# Patient Record
Sex: FEMALE | Race: WHITE | NOT HISPANIC OR LATINO | Employment: PART TIME | URBAN - METROPOLITAN AREA
[De-identification: names, ages, dates, MRNs, and addresses within clinical notes are randomized per-mention and may not be internally consistent; named-entity substitution may affect disease eponyms.]

---

## 2024-04-18 ENCOUNTER — OFFICE VISIT (OUTPATIENT)
Dept: DERMATOLOGY | Facility: CLINIC | Age: 39
End: 2024-04-18
Payer: MEDICARE

## 2024-04-18 DIAGNOSIS — L30.9 DERMATITIS: ICD-10-CM

## 2024-04-18 DIAGNOSIS — L73.2 HIDRADENITIS SUPPURATIVA: Primary | ICD-10-CM

## 2024-04-18 PROCEDURE — 99204 OFFICE O/P NEW MOD 45 MIN: CPT | Mod: S$PBB,,, | Performed by: STUDENT IN AN ORGANIZED HEALTH CARE EDUCATION/TRAINING PROGRAM

## 2024-04-18 PROCEDURE — 99213 OFFICE O/P EST LOW 20 MIN: CPT | Mod: PBBFAC,PN | Performed by: STUDENT IN AN ORGANIZED HEALTH CARE EDUCATION/TRAINING PROGRAM

## 2024-04-18 PROCEDURE — 99999 PR PBB SHADOW E&M-EST. PATIENT-LVL III: CPT | Mod: PBBFAC,,, | Performed by: STUDENT IN AN ORGANIZED HEALTH CARE EDUCATION/TRAINING PROGRAM

## 2024-04-18 PROCEDURE — G2211 COMPLEX E/M VISIT ADD ON: HCPCS | Mod: S$PBB,,, | Performed by: STUDENT IN AN ORGANIZED HEALTH CARE EDUCATION/TRAINING PROGRAM

## 2024-04-18 RX ORDER — CLINDAMYCIN PHOSPHATE 10 MG/ML
SOLUTION TOPICAL DAILY
Qty: 60 EACH | Refills: 2 | Status: SHIPPED | OUTPATIENT
Start: 2024-04-18 | End: 2025-04-18

## 2024-04-18 RX ORDER — DOXYCYCLINE HYCLATE 100 MG
100 TABLET ORAL DAILY
Qty: 90 TABLET | Refills: 0 | Status: SHIPPED | OUTPATIENT
Start: 2024-04-18

## 2024-04-18 RX ORDER — MOMETASONE FUROATE 1 MG/G
OINTMENT TOPICAL DAILY
Qty: 45 G | Refills: 1 | Status: SHIPPED | OUTPATIENT
Start: 2024-04-18

## 2024-04-18 NOTE — PROGRESS NOTES
Subjective:      Patient ID:  Darlene Wayne is a 38 y.o. female who presents for No chief complaint on file.    History of Present Illness: The patient presents with chief complaint of bumps/ingrown hairs  Located: on the groin/mons pubis and outer labia .  Duration: recently developed these bumps a few weeks ago  Symptoms: reports largely asymptomatic, but firm nodules on either side of the outer labia  Treatment: was given bactrim by her Ob/gyn with minimal improvement.      pt states she may have poison ivy on the hands  Duration: 3-4 weeks   Signs/Symptoms: swelling, bumps, spreading  Prior treatments: none          Review of Systems   Constitutional:  Negative for fever and chills.   Skin:  Positive for itching, rash and dry skin.       Objective:   Physical Exam   Constitutional: She appears well-developed and well-nourished. No distress.   Genitourinary:         Neurological: She is alert and oriented to person, place, and time. She is not disoriented.   Psychiatric: She has a normal mood and affect.   Skin:   Areas Examined (abnormalities noted in diagram):   Head / Face Inspection Performed  Neck Inspection Performed  Genitals / Buttocks / Groin Inspection Performed  Nails and Digits Inspection Performed           Diagram Legend     Erythematous scaling macule/papule c/w actinic keratosis       Vascular papule c/w angioma      Pigmented verrucoid papule/plaque c/w seborrheic keratosis      Yellow umbilicated papule c/w sebaceous hyperplasia      Irregularly shaped tan macule c/w lentigo     1-2 mm smooth white papules consistent with Milia      Movable subcutaneous cyst with punctum c/w epidermal inclusion cyst      Subcutaneous movable cyst c/w pilar cyst      Firm pink to brown papule c/w dermatofibroma      Pedunculated fleshy papule(s) c/w skin tag(s)      Evenly pigmented macule c/w junctional nevus     Mildly variegated pigmented, slightly irregular-bordered macule c/w mildly atypical nevus       Flesh colored to evenly pigmented papule c/w intradermal nevus       Pink pearly papule/plaque c/w basal cell carcinoma      Erythematous hyperkeratotic cursted plaque c/w SCC      Surgical scar with no sign of skin cancer recurrence      Open and closed comedones      Inflammatory papules and pustules      Verrucoid papule consistent consistent with wart     Erythematous eczematous patches and plaques     Dystrophic onycholytic nail with subungual debris c/w onychomycosis     Umbilicated papule    Erythematous-base heme-crusted tan verrucoid plaque consistent with inflamed seborrheic keratosis     Erythematous Silvery Scaling Plaque c/w Psoriasis     See annotation      Assessment / Plan:        Hidradenitis suppurativa - [place] I  -     doxycycline (VIBRA-TABS) 100 MG tablet; Take 1 tablet (100 mg total) by mouth once daily.  Dispense: 90 tablet; Refill: 0  -     clindamycin (CLEOCIN T) 1 % Swab; Apply topically once daily.  Dispense: 60 each; Refill: 2    Dermatitis - appears consistent with allergic contact dermatitis.   -     mometasone (ELOCON) 0.1 % ointment; Apply topically once daily. For rash.  Dispense: 45 g; Refill: 1             Follow up in about 3 months (around 7/18/2024).

## 2024-05-07 ENCOUNTER — TELEPHONE (OUTPATIENT)
Dept: DERMATOLOGY | Facility: CLINIC | Age: 39
End: 2024-05-07
Payer: MEDICARE

## 2024-05-07 NOTE — TELEPHONE ENCOUNTER
Spoke with patient trying to be seen sooner than scheduked appt. Pt placed on waiting list ----- Message from Saroj Reynolds sent at 5/7/2024 12:07 PM CDT -----  Contact: Patient  Patient is calling to inform that her rash is spreading and getting worst. Please call patient at [phone]   Patient is requesting an appt as there is nothing available to schedule.

## 2024-06-13 ENCOUNTER — OFFICE VISIT (OUTPATIENT)
Dept: DERMATOLOGY | Facility: CLINIC | Age: 39
End: 2024-06-13
Payer: MEDICARE

## 2024-06-13 DIAGNOSIS — L73.2 HIDRADENITIS SUPPURATIVA: Primary | ICD-10-CM

## 2024-06-13 DIAGNOSIS — L30.9 DERMATITIS: ICD-10-CM

## 2024-06-13 PROCEDURE — 99213 OFFICE O/P EST LOW 20 MIN: CPT | Mod: PBBFAC,PN | Performed by: STUDENT IN AN ORGANIZED HEALTH CARE EDUCATION/TRAINING PROGRAM

## 2024-06-13 PROCEDURE — G2211 COMPLEX E/M VISIT ADD ON: HCPCS | Mod: S$PBB,,, | Performed by: STUDENT IN AN ORGANIZED HEALTH CARE EDUCATION/TRAINING PROGRAM

## 2024-06-13 PROCEDURE — 99213 OFFICE O/P EST LOW 20 MIN: CPT | Mod: S$PBB,,, | Performed by: STUDENT IN AN ORGANIZED HEALTH CARE EDUCATION/TRAINING PROGRAM

## 2024-06-13 PROCEDURE — 99999 PR PBB SHADOW E&M-EST. PATIENT-LVL III: CPT | Mod: PBBFAC,,, | Performed by: STUDENT IN AN ORGANIZED HEALTH CARE EDUCATION/TRAINING PROGRAM

## 2024-06-13 NOTE — PROGRESS NOTES
Subjective:       Patient ID:  Darlene Wayne is a 38 y.o. female who presents for   Chief Complaint   Patient presents with    Lesion     History of Present Illness: The patient presents for follow up of HS and eczema on the hands. Last seen on 4/18/24. For HS located in the groin, was started on doxycycline and topical clindamycin. Reports much improvement in symptoms with resolution of the lesions; no recent flares. For the rash on the hands, was started on mometasone ointment and reports resolution as well. Has had some similar breakouts on the upper thighs that has been a little itchy, but hasn't yet tried anything for it. Otherwise, doing well.       Lesion        Review of Systems   Constitutional:  Negative for fever and chills.   Skin:  Positive for itching, rash and dry skin.        Objective:    Physical Exam   Constitutional: She appears well-developed and well-nourished. No distress.   Neurological: She is alert and oriented to person, place, and time. She is not disoriented.   Psychiatric: She has a normal mood and affect.   Skin:   Areas Examined (abnormalities noted in diagram):   Head / Face Inspection Performed  Neck Inspection Performed  RUE Inspected  LUE Inspection Performed  RLE Inspected  LLE Inspection Performed              Diagram Legend     Erythematous scaling macule/papule c/w actinic keratosis       Vascular papule c/w angioma      Pigmented verrucoid papule/plaque c/w seborrheic keratosis      Yellow umbilicated papule c/w sebaceous hyperplasia      Irregularly shaped tan macule c/w lentigo     1-2 mm smooth white papules consistent with Milia      Movable subcutaneous cyst with punctum c/w epidermal inclusion cyst      Subcutaneous movable cyst c/w pilar cyst      Firm pink to brown papule c/w dermatofibroma      Pedunculated fleshy papule(s) c/w skin tag(s)      Evenly pigmented macule c/w junctional nevus     Mildly variegated pigmented, slightly irregular-bordered macule c/w mildly  atypical nevus      Flesh colored to evenly pigmented papule c/w intradermal nevus       Pink pearly papule/plaque c/w basal cell carcinoma      Erythematous hyperkeratotic cursted plaque c/w SCC      Surgical scar with no sign of skin cancer recurrence      Open and closed comedones      Inflammatory papules and pustules      Verrucoid papule consistent consistent with wart     Erythematous eczematous patches and plaques     Dystrophic onycholytic nail with subungual debris c/w onychomycosis     Umbilicated papule    Erythematous-base heme-crusted tan verrucoid plaque consistent with inflamed seborrheic keratosis     Erythematous Silvery Scaling Plaque c/w Psoriasis     See annotation      Assessment / Plan:        Hidradenitis suppurativa - much improvement following doxycyline and topical clindamycin use. Continue as needed for any new flares.     Dermatitis - areas on the hands resolved with mometasone. Has a few rashes present on the thighs; recommend to try mometasone ointment to the area. Counseled patient on gentle skin care regimen, including need for sensitive soaps/detergents, as well as need for frequent use of sensitize moisturizers.            Follow up in about 1 year (around 6/13/2025).

## 2025-01-02 DIAGNOSIS — L73.2 HIDRADENITIS SUPPURATIVA: ICD-10-CM

## 2025-01-02 NOTE — TELEPHONE ENCOUNTER
----- Message from Luciana sent at 1/2/2025 11:06 AM CST -----  Contact: self  Type:  RX Refill Request --2nd request--    Who Called:  Pt   Refill or New Rx: Refill   RX Name and Strength: doxycycline (VIBRA-TABS) 100 MG tablet   How is the patient currently taking it? (ex. 1XDay):  Is this a 30 day or 90 day RX:   Preferred Pharmacy with phone number:   [id number] [place] [address] [facility] & [address] [address]  Phone: [phone] Fax: [phone]  Local or Mail Order: Local  Ordering Provider: Dr. Londono   Would the patient rather a call back or a response via [facility]?  Call   Best Call Back Number: [phone]  Additional Information: Pt states she has another flare up / bump in the hair follicle   Please call to advise... Thank you...

## 2025-01-02 NOTE — TELEPHONE ENCOUNTER
----- Message from Yane sent at 12/31/2024 12:11 PM CST -----  Contact: SELF  [phone]  Type:  RX Refill Request    Who Called: Darlene  Refill or New Rx:REFILL  RX Name and Strength: doxycycline (VIBRA-TABS) 100 MG tablet  How is the patient currently taking it? (ex. 1XDay):  Is this a 30 day or 90 day RX:  Preferred Pharmacy with phone number:  [id number] [address] & [address] [address]  Phone: [phone] Fax: [phone]     Local or Mail Order:LOCAL  Ordering Provider:ZAHIDA  Would the patient rather a call back or a response via [facility]? CECELIA  Best Call Back Number: [phone]  Additional Information:

## 2025-02-06 RX ORDER — DOXYCYCLINE HYCLATE 100 MG
100 TABLET ORAL DAILY
Qty: 30 TABLET | Refills: 0 | Status: SHIPPED | OUTPATIENT
Start: 2025-02-06

## 2025-04-01 ENCOUNTER — OFFICE VISIT (OUTPATIENT)
Dept: DERMATOLOGY | Facility: CLINIC | Age: 40
End: 2025-04-01
Payer: MEDICARE

## 2025-04-01 DIAGNOSIS — L73.2 HIDRADENITIS SUPPURATIVA: Primary | ICD-10-CM

## 2025-04-01 PROCEDURE — 99213 OFFICE O/P EST LOW 20 MIN: CPT | Mod: PBBFAC | Performed by: STUDENT IN AN ORGANIZED HEALTH CARE EDUCATION/TRAINING PROGRAM

## 2025-04-01 PROCEDURE — G2211 COMPLEX E/M VISIT ADD ON: HCPCS | Mod: S$PBB,,, | Performed by: STUDENT IN AN ORGANIZED HEALTH CARE EDUCATION/TRAINING PROGRAM

## 2025-04-01 PROCEDURE — 99213 OFFICE O/P EST LOW 20 MIN: CPT | Mod: S$PBB,,, | Performed by: STUDENT IN AN ORGANIZED HEALTH CARE EDUCATION/TRAINING PROGRAM

## 2025-04-01 PROCEDURE — 99999 PR PBB SHADOW E&M-EST. PATIENT-LVL III: CPT | Mod: PBBFAC,,, | Performed by: STUDENT IN AN ORGANIZED HEALTH CARE EDUCATION/TRAINING PROGRAM

## 2025-04-01 RX ORDER — DICYCLOMINE HYDROCHLORIDE 10 MG/1
10 CAPSULE ORAL 2 TIMES DAILY PRN
COMMUNITY
Start: 2025-01-14

## 2025-04-01 RX ORDER — CLINDAMYCIN PHOSPHATE 10 MG/ML
SOLUTION TOPICAL DAILY
Qty: 60 EACH | Refills: 2 | Status: SHIPPED | OUTPATIENT
Start: 2025-04-01 | End: 2026-04-01

## 2025-04-01 RX ORDER — ARIPIPRAZOLE 10 MG/1
10 TABLET ORAL
COMMUNITY

## 2025-04-01 RX ORDER — DOXYCYCLINE HYCLATE 100 MG
100 TABLET ORAL DAILY
Qty: 90 TABLET | Refills: 1 | Status: SHIPPED | OUTPATIENT
Start: 2025-04-01

## 2025-04-01 RX ORDER — CABERGOLINE 0.5 MG/1
0.25 TABLET ORAL
COMMUNITY
Start: 2025-02-13 | End: 2026-02-13

## 2025-04-01 NOTE — PROGRESS NOTES
Subjective:       Patient ID:  Darlene Wayne is a 39 y.o. female who presents for   Chief Complaint   Patient presents with    Hidradenitis Suppurativa     Follow up. C/o boil on groin area     History of Present Illness: The patient presents for follow up of hidradenitis suppurativa, last seen on 6/13/24. Patient was doing well up until several weeks ago, when she started to having a painful swelling/boil on the left labia area. The area eventually ruptured and drained thick material. Today, the lesion is largely resolved. Had previously been on doxycycline and topical clindamycin which was helpful at the time, but this time did not have anything for symptoms (though they are largely resolved). No other similar lesions elsewhere.           Review of Systems   Constitutional:  Negative for fever and chills.   Skin:  Negative for itching, rash and dry skin.        Objective:    Physical Exam   Constitutional: She appears well-developed and well-nourished. No distress.   Neurological: She is alert and oriented to person, place, and time. She is not disoriented.   Psychiatric: She has a normal mood and affect.   Skin:   Areas Examined (abnormalities noted in diagram):   Genitals / Buttocks / Groin Inspection Performed              Diagram Legend     Erythematous scaling macule/papule c/w actinic keratosis       Vascular papule c/w angioma      Pigmented verrucoid papule/plaque c/w seborrheic keratosis      Yellow umbilicated papule c/w sebaceous hyperplasia      Irregularly shaped tan macule c/w lentigo     1-2 mm smooth white papules consistent with Milia      Movable subcutaneous cyst with punctum c/w epidermal inclusion cyst      Subcutaneous movable cyst c/w pilar cyst      Firm pink to brown papule c/w dermatofibroma      Pedunculated fleshy papule(s) c/w skin tag(s)      Evenly pigmented macule c/w junctional nevus     Mildly variegated pigmented, slightly irregular-bordered macule c/w mildly atypical nevus       Flesh colored to evenly pigmented papule c/w intradermal nevus       Pink pearly papule/plaque c/w basal cell carcinoma      Erythematous hyperkeratotic cursted plaque c/w SCC      Surgical scar with no sign of skin cancer recurrence      Open and closed comedones      Inflammatory papules and pustules      Verrucoid papule consistent consistent with wart     Erythematous eczematous patches and plaques     Dystrophic onycholytic nail with subungual debris c/w onychomycosis     Umbilicated papule    Erythematous-base heme-crusted tan verrucoid plaque consistent with inflamed seborrheic keratosis     Erythematous Silvery Scaling Plaque c/w Psoriasis     See annotation      Assessment / Plan:        Hidradenitis suppurativa - lesion in groin/labia, slowly improving. Will restart back doxycycline and topical clindamycin. Recommend to use clindamycin for maintenance and use doxycycline for when flares occur.   -     doxycycline (VIBRA-TABS) 100 MG tablet; Take 1 tablet (100 mg total) by mouth once daily.  Dispense: 90 tablet; Refill: 1  -     clindamycin (CLEOCIN T) 1 % Swab; Apply topically once daily.  Dispense: 60 each; Refill: 2             Follow up in about 6 months (around 10/1/2025).